# Patient Record
Sex: FEMALE | Race: AMERICAN INDIAN OR ALASKA NATIVE
[De-identification: names, ages, dates, MRNs, and addresses within clinical notes are randomized per-mention and may not be internally consistent; named-entity substitution may affect disease eponyms.]

---

## 2021-05-23 ENCOUNTER — HOSPITAL ENCOUNTER (EMERGENCY)
Dept: HOSPITAL 5 - ED | Age: 41
Discharge: HOME | End: 2021-05-23
Payer: MEDICAID

## 2021-05-23 VITALS — SYSTOLIC BLOOD PRESSURE: 107 MMHG | DIASTOLIC BLOOD PRESSURE: 87 MMHG

## 2021-05-23 DIAGNOSIS — Z88.0: ICD-10-CM

## 2021-05-23 DIAGNOSIS — Z79.899: ICD-10-CM

## 2021-05-23 DIAGNOSIS — Z79.1: ICD-10-CM

## 2021-05-23 DIAGNOSIS — Z88.8: ICD-10-CM
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
## 2021-05-23 DIAGNOSIS — S93.602A: ICD-10-CM
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
## 2021-05-23 DIAGNOSIS — S93.402A: Primary | ICD-10-CM

## 2021-05-23 NOTE — XRAY REPORT
LEFT ANKLE 4 VIEW(S)



INDICATION / CLINICAL INFORMATION:

fall/ankle pain



COMPARISON:

None available.

 

FINDINGS:



BONES / JOINT(S): No acute fracture or subluxation. There is inferior calcaneal enthesopathy at the p
lantar fascia insertion.



SOFT TISSUES: No significant abnormality.



ADDITIONAL FINDINGS: None.





LEFT FOOT 3 VIEW(S)



INDICATION / CLINICAL INFORMATION:

fall/ankle pain



COMPARISON:

None available.

 

FINDINGS:



BONES / JOINT(S): No acute fracture or subluxation. There is inferior calcaneal enthesopathy. There i
s a small os perineum.



SOFT TISSUES: No significant abnormality.



ADDITIONAL FINDINGS: None.







Signer Name: Alexis Gipson MD 

Signed: 5/23/2021 2:56 PM

Workstation Name: [id number]

## 2021-05-23 NOTE — XRAY REPORT
LEFT ANKLE 4 VIEW(S)



INDICATION / CLINICAL INFORMATION:

fall/ankle pain



COMPARISON:

None available.

 

FINDINGS:



BONES / JOINT(S): No acute fracture or subluxation. There is inferior calcaneal enthesopathy at the p
lantar fascia insertion.



SOFT TISSUES: No significant abnormality.



ADDITIONAL FINDINGS: None.





LEFT FOOT 3 VIEW(S)



INDICATION / CLINICAL INFORMATION:

fall/ankle pain



COMPARISON:

None available.

 

FINDINGS:



BONES / JOINT(S): No acute fracture or subluxation. There is inferior calcaneal enthesopathy. There i
s a small os perineum.



SOFT TISSUES: No significant abnormality.



ADDITIONAL FINDINGS: None.







Signer Name: Alexis Gipson MD 

Signed: 5/23/2021 2:56 PM

Workstation Name: [id number]

## 2021-05-23 NOTE — EMERGENCY DEPARTMENT REPORT
ED Lower Extremity HPI





- General


Chief Complaint: Extremity Injury, Lower


Stated Complaint: FOOT INJURY


Time Seen by Provider: 21 14:04


Source: patient


Mode of arrival: Ambulatory


Limitations: No Limitations





- History of Present Illness


Initial Comments: 


40-year-old female presents to the ER today with complaints of left ankle/foot 

pain after an accidental fall about 2 weeks ago.  Patient states that she was at

a gas station and tripped over uneven pavement while walking.  Patient states 

that when she fell she somehow injured her left ankle and left foot.  She states

that she thought she would get better and therefore did not get it checked out 

at the time of the injury but she states that she has been having increasing 

pain and swelling to her left ankle and foot since the injury.


MD Complaint: ankle injury, foot injury


-: Sudden, week(s) (2)





- Related Data


                                  Previous Rx's











 Medication  Instructions  Recorded  Last Taken  Type


 


Acetaminophen/Codeine [Tylenol 1 tab PO Q4HR PRN #12 tablet 21 Unknown Rx





/Codeine # 3 tab]    


 


Ibuprofen [Motrin] 800 mg PO Q8HR PRN #30 tablet 21 Unknown Rx











                                    Allergies











Allergy/AdvReac Type Severity Reaction Status Date / Time


 


carbamazepine [From Tegretol] Allergy  Unknown Verified 21 14:09


 


Penicillins Allergy  Unknown Verified 21 14:09














ED Review of Systems


ROS: 


Stated complaint: FOOT INJURY


Other details as noted in HPI





Comment: All other systems reviewed and negative


Constitutional: denies: chills, fever


Eyes: denies: eye pain, eye discharge, vision change


ENT: denies: ear pain, throat pain, dental pain, hearing loss, epistaxis, 

congestion


Respiratory: denies: cough, shortness of breath, SOB with exertion, SOB at rest,

wheezing


Cardiovascular: denies: chest pain, palpitations, dyspnea on exertion, edema, 

syncope, paroxysmal nocturnal dyspnea


Gastrointestinal: denies: abdominal pain, nausea, diarrhea, constipation, 

hematemesis, hematochezia


Genitourinary: denies: urgency, dysuria, discharge


Musculoskeletal: joint swelling, arthralgia


Skin: denies: rash, lesions


Neurological: abnormal gait.  denies: headache, weakness, numbness, 

paresthesias, confusion, vertigo


Psychiatric: denies: anxiety, depression, auditory hallucinations, visual 

hallucinations, homicidal thoughts, suicidal thoughts


Hematological/Lymphatic: denies: easy bleeding, easy bruising





ED Past Medical Hx





- Medications


Home Medications: 


                                Home Medications











 Medication  Instructions  Recorded  Confirmed  Last Taken  Type


 


Acetaminophen/Codeine [Tylenol 1 tab PO Q4HR PRN #12 tablet 21  Unknown Rx





/Codeine # 3 tab]     


 


Ibuprofen [Motrin] 800 mg PO Q8HR PRN #30 tablet 21  Unknown Rx














ED Physical Exam





- General


Limitations: No Limitations


General appearance: alert, in distress (Patient appears uncomfortable due to 

pain in ankle), obese





- Head


Head exam: Present: atraumatic, normocephalic, normal inspection





- Eye


Eye exam: Present: normal appearance, PERRL, EOMI


Pupils: Present: normal accommodation





- Neck


Neck exam: Present: normal inspection, full ROM





- Respiratory


Respiratory exam: Present: normal lung sounds bilaterally.  Absent: respiratory 

distress





- Cardiovascular


Cardiovascular Exam: Present: regular rate





- Expanded Lower Extremity Exam


  ** Left


Ankle exam: Present: tenderness (Moderate tenderness to palpation to the 

anterior and lateral aspect of the left ankle), swelling (Mild swelling noted 

mainly to the lateral aspect of the left ankle).  Absent: full ROM (Range of 

motion mildly reduced due to pain), abrasion, laceration, ecchymosis, deformity,

 crepidus, dislocation, erythema


Foot/Toe exam: Present: tenderness (Medial aspect of the right foot), tenderness

 at base of 5th metatarsal.  Absent: swelling, abrasion, laceration, ecchymosis,

 deformity, crepidus, dislocation, amputation, puncture wound, foreign body, 

calcaneal tenderness, nail avulsion, subungual hematoma


Neuro vascular tendon exam: Absent: no vascular compromise, abnormal cap refill,

 motor deficit, sensory deficit, tendon deficit


Gait: Positive: observed and limited by pain





- Neurological Exam


Neurological exam: Present: alert, oriented X3, CN II-XII intact





- Psychiatric


Psychiatric exam: Present: normal affect, normal mood





- Skin


Skin exam: Present: intact





ED Course


                                   Vital Signs











  21





  14:03


 


Temperature 99.1 F


 


Pulse Rate 69


 


Respiratory 20





Rate 


 


Blood Pressure 107/87


 


O2 Sat by Pulse 100





Oximetry 














ED Lower Extremity MDM





- Radiology Data


Radiology results: report reviewed


Patient: BEATRIZ VELAZQUEZ                                                       

         MR#: [id number]  


[id number]          


: 1980                                                                

Acct:[id number]      


 


Age/Sex: 40 / F                                                                

ADM Date: 21     


 


Loc: ED       


Attending Dr:   


 


 


Ordering Physician: THU PURCELL  


Date of Service: 21  


Procedure(s): XR ankle 3+V LT  


Accession Number(s): [id number]  


 


cc: THU PURCELL   


 


Fluoro Time In Minutes:   


 


LEFT ANKLE 4 VIEW(S)  


 


 INDICATION / CLINICAL INFORMATION:  


 fall/ankle pain  


 


 COMPARISON:  


 None available.  


 


 FINDINGS:  


 


 BONES / JOINT(S): No acute fracture or subluxation. There is inferior calcaneal

 enthesopathy at the


plantar fascia insertion.  


 


 SOFT TISSUES: No significant abnormality.  


 


 ADDITIONAL FINDINGS: None.  


 


 


 LEFT FOOT 3 VIEW(S)  


 


 INDICATION / CLINICAL INFORMATION:  


 fall/ankle pain  


 


 COMPARISON:  


 None available.  


 


 FINDINGS:  


 


 BONES / JOINT(S): No acute fracture or subluxation. There is inferior calcaneal

 enthesopathy. There


is a small os perineum.  


 


 SOFT TISSUES: No significant abnormality.  


 


 ADDITIONAL FINDINGS: None.  


 


 


 


 Signer Name: Edison Gipson MD   


 Signed: 2021 2:56 PM  


 Workstation Name: [id number]   


 


 


Transcribed By: SS  


Dictated By: EDISON GIPSON  


Electronically Authenticated By: EDISON GIPSON    


Signed Date/Time: 21                                


 


 


 


DD/DT: 21                                                            

  


TD/TT:





Critical care attestation.: 


If time is entered above; I have spent that time in minutes in the direct care 

of this critically ill patient, excluding procedure time.








ED Disposition


Clinical Impression: 


 Ankle sprain, Foot sprain





Disposition: DC-01 TO HOME OR SELFCARE


Is pt being admited?: No


Does the pt Need Aspirin: No


Condition: Stable


Instructions:  Ankle Sprain, Foot Sprain


Additional Instructions: 


Rest, ice and elevate foot/ankle for the next 3-4 days. Use crutches/air splint 

as directed. I recommend that you follow up with orthopedic Specialist next week

 for further evaluation especially if your symptoms persist. Return to ED if 

symptoms changes or worsens in anyway.


Prescriptions: 


Ibuprofen [Motrin] 800 mg PO Q8HR PRN #30 tablet


 PRN Reason: PAIN


Acetaminophen/Codeine [Tylenol /Codeine # 3 tab] 1 tab PO Q4HR PRN #12 tablet


 PRN Reason: Pain


Referrals: 


SOO ANDERSEN MD [Staff Physician] - 3-5 Days


Forms:  Work/School Release Form(ED)


Time of Disposition: 15:22

## 2022-01-20 ENCOUNTER — HOSPITAL ENCOUNTER (EMERGENCY)
Dept: HOSPITAL 5 - ED | Age: 42
LOS: 1 days | Discharge: HOME | End: 2022-01-21
Payer: COMMERCIAL

## 2022-01-20 VITALS — DIASTOLIC BLOOD PRESSURE: 88 MMHG | SYSTOLIC BLOOD PRESSURE: 148 MMHG
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
## 2022-01-20 DIAGNOSIS — S46.912A: Primary | ICD-10-CM
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
## 2022-01-20 PROCEDURE — 99283 EMERGENCY DEPT VISIT LOW MDM: CPT

## 2022-01-20 PROCEDURE — 72100 X-RAY EXAM L-S SPINE 2/3 VWS: CPT
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
